# Patient Record
Sex: MALE | Race: OTHER | NOT HISPANIC OR LATINO | URBAN - METROPOLITAN AREA
[De-identification: names, ages, dates, MRNs, and addresses within clinical notes are randomized per-mention and may not be internally consistent; named-entity substitution may affect disease eponyms.]

---

## 2019-12-05 ENCOUNTER — EMERGENCY (EMERGENCY)
Facility: HOSPITAL | Age: 59
LOS: 0 days | Discharge: HOME | End: 2019-12-05
Admitting: EMERGENCY MEDICINE
Payer: MEDICAID

## 2019-12-05 VITALS
HEART RATE: 91 BPM | OXYGEN SATURATION: 97 % | DIASTOLIC BLOOD PRESSURE: 82 MMHG | RESPIRATION RATE: 20 BRPM | TEMPERATURE: 98 F | SYSTOLIC BLOOD PRESSURE: 138 MMHG

## 2019-12-05 DIAGNOSIS — K08.89 OTHER SPECIFIED DISORDERS OF TEETH AND SUPPORTING STRUCTURES: ICD-10-CM
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
## 2019-12-05 DIAGNOSIS — S00.502A UNSPECIFIED SUPERFICIAL INJURY OF ORAL CAVITY, INITIAL ENCOUNTER: ICD-10-CM

## 2019-12-05 PROCEDURE — 99282 EMERGENCY DEPT VISIT SF MDM: CPT

## 2019-12-05 NOTE — ED ADULT TRIAGE NOTE - CHIEF COMPLAINT QUOTE
pt BIBA for c/o dental pain and mouth bleeding, since this morning. Pt on aspirin. Pt does not know how the tooth came out.

## 2019-12-05 NOTE — ED PROVIDER NOTE - OBJECTIVE STATEMENT
59 y.o. male with a PMH of "neurological developmental disorder" presented to the ER c/o bleeding from mouth since this AM.  Pt woke up with bleeding.  Denies trauma, dizziness, chest pain, SOB.  No other complaints.

## 2019-12-05 NOTE — ED PROVIDER NOTE - PATIENT PORTAL LINK FT
You can access the FollowMyHealth Patient Portal offered by [facility] by registering at the following website: [email or web address]. By joining [employer]’s FollowMyHealth portal, you will also be able to view your health information using other applications (apps) compatible with our system.

## 2019-12-05 NOTE — ED PROVIDER NOTE - ENMT, MLM
Airway patent, Nasal mucosa clear. Mouth with normal mucosa. Throat has no vesicles, no oropharyngeal exudates and uvula is midline.  (+) poor dentition, (+) small 2mm area of bleeding mid-anterior tongue

## 2019-12-05 NOTE — ED ADULT NURSE NOTE - OBJECTIVE STATEMENT
Pt a&ox3, pt c/o dental pain and bleeding from tooth. Pt in NAD at this time, not on AC, denies injury to mouth. No active bleeding at this time. Pt discharged home, awaiting transport by car service.

## 2021-04-08 NOTE — ED ADULT NURSE NOTE - CAS DISCH BELONGINGS RETURNED
Pt reports rash to R ribs, sudden onset last night. Pt reports sever pain rated 10/10, unable to touch rash without pain. Hx of shingles 10 years ago.
Not applicable

## 2021-04-13 NOTE — ED PROVIDER NOTE - NSFOLLOWUPCLINICS_GEN_ALL_ED_FT
4/13 [employer] message sent to pt 1st attempt NP [facility]  Medicine  [address], [place]   Phone: [phone]  Fax:   Follow Up Time: 1-3 Days

## 2025-04-02 ENCOUNTER — EMERGENCY (EMERGENCY)
Facility: HOSPITAL | Age: 65
LOS: 0 days | Discharge: HOME | End: 2025-04-02
Attending: EMERGENCY MEDICINE
Payer: MEDICAID

## 2025-04-02 VITALS
OXYGEN SATURATION: 98 % | DIASTOLIC BLOOD PRESSURE: 71 MMHG | SYSTOLIC BLOOD PRESSURE: 115 MMHG | HEART RATE: 73 BPM | RESPIRATION RATE: 18 BRPM | WEIGHT: 190.04 LBS
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
## 2025-04-02 DIAGNOSIS — S80.211A ABRASION, RIGHT KNEE, INITIAL ENCOUNTER: ICD-10-CM

## 2025-04-02 DIAGNOSIS — M25.561 PAIN IN RIGHT KNEE: ICD-10-CM
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
## 2025-04-02 PROCEDURE — 73562 X-RAY EXAM OF KNEE 3: CPT | Mod: RT

## 2025-04-02 PROCEDURE — 73562 X-RAY EXAM OF KNEE 3: CPT | Mod: 26,RT

## 2025-04-02 PROCEDURE — 99284 EMERGENCY DEPT VISIT MOD MDM: CPT

## 2025-04-02 PROCEDURE — 99283 EMERGENCY DEPT VISIT LOW MDM: CPT | Mod: 25

## 2025-04-02 NOTE — ED PROVIDER NOTE - NSFOLLOWUPINSTRUCTIONS_ED_ALL_ED_FT
Fall Prevention    WHAT YOU NEED TO KNOW:    Fall prevention includes ways to make your home and other areas safer. Prevention also includes ways you can move more carefully to prevent a fall. Health conditions that cause changes in your blood pressure, vision, or muscle strength and coordination may increase your risk for falls. Medicines may also increase your risk for falls if they make you dizzy, weak, or sleepy.    DISCHARGE INSTRUCTIONS:    Arrange to have someone call your local emergency number (911 in the [place]) if:    You have fallen and are found unconscious.    You have fallen and cannot move part of your body.  Call your doctor if:    You have fallen and have pain or a headache.    You have questions or concerns about your condition or care.  Fall prevention tips:    Stand or sit up slowly. This may help you keep your balance and prevent falls.    Use assistive devices as directed. Your healthcare provider may suggest that you use a cane or walker to help you keep your balance. You may need to have grab bars put in your bathroom near the toilet or in the shower.    Wear shoes that fit well and have soles that . Wear shoes both inside and outside. Use slippers with good . Do not wear shoes with high heels.    Stay active. Exercise can help strengthen your muscles and improve your balance. Your healthcare provider may recommend water aerobics or walking. He or she may also recommend physical therapy to improve your coordination. Never start an exercise program without talking to your healthcare provider first.   Family Walking for Exercise      Manage your medical conditions. Keep all appointments with your healthcare providers. Visit your eye doctor as directed.  Home safety tips:  Fall Prevention for Adults    Wear a personal alarm. This is a device that allows you to call for help if you fall. Ask your healthcare provider for more information.    Add items to prevent falls in the bathroom. Put nonslip strips on your bath or shower floor to prevent you from slipping. Use a bath mat if you do not have carpet in the bathroom. This will prevent you from falling when you step out of the bath or shower. Use a shower seat so you do not need to stand while you shower. Sit on the toilet or a chair in your bathroom to dry yourself and put on clothing. This will prevent you from losing your balance from drying or dressing yourself while you are standing.    Keep paths clear. Remove books, shoes, and other objects from walkways and stairs. Place cords for telephones and lamps out of the way so that you do not need to walk over them. Tape them down if you cannot move them. Remove small rugs. If you cannot remove a rug, secure it with double-sided tape. This will prevent you from tripping.    Install bright lights in your home. Use night lights to help light paths to the bathroom or kitchen. Always turn on the light before you start walking.    Keep items you use often on shelves within reach. Do not use a step stool to help you reach an item.    Paint or place reflective tape on the edges of your stairs. This will help you see the stairs better.  Plan ahead in case you do fall: Talk with family members, friends, and neighbors to create a fall plan. Someone will need to call for emergency help if you are injured or found unconscious. If possible, keep a mobile phone with you at all times, or wear an emergency alert device. You can contact emergency services by pressing a button on the device. Ask your healthcare provider for more information.    Follow up with your doctor as directed: Write down your questions so you remember to ask them during your visits.

## 2025-04-02 NOTE — ED PROVIDER NOTE - CARE PLAN
Principal Discharge DX:	Fall   1 Principal Discharge DX:	Fall  Secondary Diagnosis:	Right knee injury  Secondary Diagnosis:	Abrasion

## 2025-04-02 NOTE — ED PROVIDER NOTE - OBJECTIVE STATEMENT
64 year old male with no pmhx presents to ed s/p fall. Pt tripped and fell onto bilateral knees, complaining of right knee pain. No head injury, ha, neck or back pain. Pt has been ambulating.

## 2025-04-02 NOTE — ED PROVIDER NOTE - PATIENT PORTAL LINK FT
You can access the FollowMyHealth Patient Portal offered by [facility] by registering at the following website: [email or web address]. By joining [employer]’s FollowMyHealth portal, you will also be able to view your health information using other applications (apps) compatible with our system.

## 2025-04-02 NOTE — ED PROVIDER NOTE - CLINICAL SUMMARY MEDICAL DECISION MAKING FREE TEXT BOX
xray obtained,  Film interpreted by me. no acute fx or dislocation , rec keeping abrasion clean, ice to area

## 2025-04-02 NOTE — ED PROVIDER NOTE - PHYSICAL EXAMINATION
GEN: Patient in no acute distress  MS: Normal ROM in all extremities. No midline spinal tenderness. pulses 2 +. no calf tenderness or swelling.  SKIN: abrasion to right anterior patella, no lacerations. Warm, dry, no acute rashes. Good turgor  NEURO: Strength 5/5 with no sensory deficits. Steady gait.

## 2025-04-02 NOTE — ED PROVIDER NOTE - ATTENDING APP SHARED VISIT CONTRIBUTION OF CARE
63 yo M  PMHx noted presents from [facility] for evaluation of knee injury s/p trip and fall.  no head injury, no neck or back pain.  Pt was able to ambulate afterwards.  on exam pt in NAD AAO x 3, + abrasion to rt knee, mild tenderness, no swelling, good ROM, no midline vertebral tenderness, no step off, hips non tender, abd soft nt nd